# Patient Record
Sex: FEMALE | Race: WHITE | URBAN - METROPOLITAN AREA
[De-identification: names, ages, dates, MRNs, and addresses within clinical notes are randomized per-mention and may not be internally consistent; named-entity substitution may affect disease eponyms.]

---

## 2017-04-25 ENCOUNTER — MYC MEDICAL ADVICE (OUTPATIENT)
Dept: OBGYN | Facility: CLINIC | Age: 27
End: 2017-04-25

## 2017-04-25 DIAGNOSIS — Z30.41 ENCOUNTER FOR SURVEILLANCE OF CONTRACEPTIVE PILLS: ICD-10-CM

## 2017-04-26 RX ORDER — LEVONORGESTREL AND ETHINYL ESTRADIOL 0.15-0.03
1 KIT ORAL DAILY
Qty: 84 TABLET | Refills: 1 | Status: SHIPPED | OUTPATIENT
Start: 2017-04-26

## 2018-07-19 ENCOUNTER — APPOINTMENT (OUTPATIENT)
Dept: GENERAL RADIOLOGY | Facility: CLINIC | Age: 28
End: 2018-07-19
Attending: STUDENT IN AN ORGANIZED HEALTH CARE EDUCATION/TRAINING PROGRAM
Payer: OTHER GOVERNMENT

## 2018-07-19 ENCOUNTER — HOSPITAL ENCOUNTER (EMERGENCY)
Facility: CLINIC | Age: 28
Discharge: HOME | End: 2018-07-19
Attending: STUDENT IN AN ORGANIZED HEALTH CARE EDUCATION/TRAINING PROGRAM | Admitting: STUDENT IN AN ORGANIZED HEALTH CARE EDUCATION/TRAINING PROGRAM
Payer: OTHER GOVERNMENT

## 2018-07-19 VITALS
HEIGHT: 68 IN | SYSTOLIC BLOOD PRESSURE: 110 MMHG | BODY MASS INDEX: 22.73 KG/M2 | OXYGEN SATURATION: 98 % | TEMPERATURE: 99 F | DIASTOLIC BLOOD PRESSURE: 76 MMHG | HEART RATE: 98 BPM | RESPIRATION RATE: 16 BRPM | WEIGHT: 150 LBS

## 2018-07-19 DIAGNOSIS — S60.212A CONTUSION OF LEFT WRIST, INITIAL ENCOUNTER: ICD-10-CM

## 2018-07-19 LAB
ALBUMIN UR-MCNC: NEGATIVE MG/DL
APPEARANCE UR: CLEAR
BILIRUB UR QL STRIP: NEGATIVE
COLOR UR AUTO: ABNORMAL
GLUCOSE UR STRIP-MCNC: NEGATIVE MG/DL
HCG UR QL: NEGATIVE
HGB UR QL STRIP: NEGATIVE
KETONES UR STRIP-MCNC: 20 MG/DL
LEUKOCYTE ESTERASE UR QL STRIP: NEGATIVE
NITRATE UR QL: NEGATIVE
PH UR STRIP: 6 PH (ref 5–7)
SOURCE: ABNORMAL
SP GR UR STRIP: 1 (ref 1–1.03)
UROBILINOGEN UR STRIP-MCNC: 0 MG/DL (ref 0–2)

## 2018-07-19 PROCEDURE — 99284 EMERGENCY DEPT VISIT MOD MDM: CPT | Mod: Z6 | Performed by: STUDENT IN AN ORGANIZED HEALTH CARE EDUCATION/TRAINING PROGRAM

## 2018-07-19 PROCEDURE — 73110 X-RAY EXAM OF WRIST: CPT | Mod: LT

## 2018-07-19 PROCEDURE — 81025 URINE PREGNANCY TEST: CPT | Performed by: STUDENT IN AN ORGANIZED HEALTH CARE EDUCATION/TRAINING PROGRAM

## 2018-07-19 PROCEDURE — 81003 URINALYSIS AUTO W/O SCOPE: CPT | Performed by: STUDENT IN AN ORGANIZED HEALTH CARE EDUCATION/TRAINING PROGRAM

## 2018-07-19 PROCEDURE — 99284 EMERGENCY DEPT VISIT MOD MDM: CPT | Performed by: STUDENT IN AN ORGANIZED HEALTH CARE EDUCATION/TRAINING PROGRAM

## 2018-07-19 NOTE — ED AVS SNAPSHOT
[facility] Emergency Department    [address] [address]    Phone:  [phone]    Fax:  [phone]                                       Lainey Stinson   MRN: [id number]    Department:  [facility] Emergency Department   Date of Visit:  7/19/2018           After Visit Summary Signature Page     I have received my discharge instructions, and my questions have been answered. I have discussed any challenges I see with this plan with the nurse or doctor.    ..........................................................................................................................................  Patient/Patient Representative Signature      ..........................................................................................................................................  Patient Representative Print Name and Relationship to Patient    ..................................................               ................................................  Date                                            Time    ..........................................................................................................................................  Reviewed by Signature/Title    ...................................................              ..............................................  Date                                                            Time

## 2018-07-19 NOTE — ED AVS SNAPSHOT
[facility] Emergency Department    [address]    [facility] [address]    Phone:  [phone]    Fax:  [phone]                                       Lainey Stinson   MRN: [id number]    Department:  [facility] Emergency Department   Date of Visit:  7/19/2018           Patient Information     Date Of Birth          1990        Your diagnoses for this visit were:     Contusion of left wrist, initial encounter        You were seen by Kevin Caldwell DO.      Follow-up Information     Follow up with [facility]. Schedule an appointment as soon as possible for a visit in 5 days.    Why:  Followup for reevaluation if symptoms persist.    Contact information:    [address]  [address]  [facility] [address] [phone]      Discharge References/Attachments     BRUISES (CONTUSIONS) (ENGLISH)    WRIST SPRAIN (ENGLISH)      24 Hour Appointment Hotline       To make an appointment at any [facility] clinic, call [phone] ([phone]). If you don't have a family doctor or clinic, we will help you find one. [facility] clinics are conveniently located to serve the needs of you and your family.             Review of your medicines      Our records show that you are taking the medicines listed below. If these are incorrect, please call your family doctor or clinic.        Dose / Directions Last dose taken    etonogestrel-ethinyl estradiol 0.12-0.015 MG/24HR vaginal ring   Commonly known as:  NUVARING   Quantity:  3 each        Insert 1 ring vaginally every 21 days then remove for 1 week then repeat with new ring.   Refills:  3        levonorgestrel-ethinyl estradiol 0.15-30 MG-MCG per tablet   Commonly known as:  NORDETTE   Dose:  1 tablet   Quantity:  84 tablet        Take 1 tablet by mouth daily   Refills:  1                Procedures and tests performed during your visit     UA reflex to Microscopic    XR Wrist Left G/E 3 Views      Orders  Needing Specimen Collection     None      Pending Results     No orders found from 7/17/2018 to 7/20/2018.            Pending Culture Results     No orders found from 7/17/2018 to 7/20/2018.            Pending Results Instructions     If you had any lab results that were not finalized at the time of your Discharge, you can call the ED Lab Result RN at [phone]. You will be contacted by this team for any positive Lab results or changes in treatment. The nurses are available 7 days a week from 10A to 6:30P.  You can leave a message 24 hours per day and they will return your call.        Test Results From Your Hospital Stay        7/19/2018  2:41 PM      Narrative     XR WRIST LEFT G/E 3 VIEWS 7/19/2018 1:50 PM    HISTORY: Pain and contusion after horse related injury.    COMPARISON: None.        Impression     IMPRESSION: Three-view left wrist show no fracture or malalignment.     ABHAY LAKE MD         7/19/2018  2:57 PM      Component Results     Component Value Ref Range & Units Status    Color Urine Straw  Final    Appearance Urine Clear  Final    Glucose Urine Negative NEG^Negative mg/dL Final    Bilirubin Urine Negative NEG^Negative Final    Ketones Urine 20 (A) NEG^Negative mg/dL Final    Specific Gravity Urine 1.005 1.003 - 1.035 Final    Blood Urine Negative NEG^Negative Final    pH Urine 6.0 5.0 - 7.0 pH Final    Protein Albumin Urine Negative NEG^Negative mg/dL Final    Urobilinogen mg/dL 0.0 0.0 - 2.0 mg/dL Final    Nitrite Urine Negative NEG^Negative Final    Leukocyte Esterase Urine Negative NEG^Negative Final    Source Midstream Urine  Final                Thank you for choosing [facility]       Thank you for choosing [facility] for your care. Our goal is always to provide you with excellent care. Hearing back from our patients is one way we can continue to improve our services. Please take a few minutes to complete the written survey that you may receive in the mail after you visit with us. Thank  you!        [facility] Information     [employer] gives you secure access to your electronic health record. If you see a primary care provider, you can also send messages to your care team and make appointments. If you have questions, please call your primary care clinic.  If you do not have a primary care provider, please call [phone] and they will assist you.        Care EveryWhere ID     This is your Care EveryWhere ID. This could be used by other organizations to access your [facility] medical records  [id number]        Equal Access to Services     RENY HAWLEY : Yenni zavaleta Somilka, waaxda luqadaha, qaybta kaalmacesar hoffman, poonam henry. So [facility] [phone].    ATENCIÓN: Si habla español, tiene a begum disposición servicios gratuitos de asistencia lingüística. Llame al [phone].    We comply with applicable federal civil rights laws and [place] laws. We do not discriminate on the basis of race, color, national origin, age, disability, sex, sexual orientation, or gender identity.            After Visit Summary       This is your record. Keep this with you and show to your community pharmacist(s) and doctor(s) at your next visit.

## 2018-07-19 NOTE — ED NOTES
Was out spraying horses and one touched the elt gate and turned and Lainey fell to the ground. She has lt wrist pain and as the day has gone on she has come muscular upper lt side back/rib pain with jaw and rt side of face. Left wrist has bruising redness and swelling, which is her main concern

## 2018-07-19 NOTE — ED PROVIDER NOTES
History     Chief Complaint   Patient presents with     Trauma     trampled by horse 2 hr ago-left mid back,jaw pain-left wrist pain and bruising-no cervical pain and no loc     HPI  Lainey Stinson is a 28 year old female who presents for evaluation of left wrist injury which was sustained nearly 2 hours prior to arrival.  Patient explains that she was spreading down her horses when one of them got too close to the electrical fence and was shocked, the startled course apparently bumped the patient to the ground and may have stepped on her when running away.  The says that the incident happened very quickly and is not sure exactly how her left wrist was injured but notes contusion and swelling along the volar aspect of her left wrist.  She also believes that the left side of her torso may have been grazed by the horse but she denies other significant areas of pain.  She is without headache, facial trauma, neck pain, back pain, chest pain, shortness of breath, abdominal pain, nausea/vomiting, pelvic pain, or other extremity injuries.  No other complaints.    Problem List:    Patient Active Problem List    Diagnosis Date Noted     Dysplasia of cervix, low grade (VANGIE 1) 01/22/2013     Priority: Medium     1/17/13:Pap--LSIL.  1/25/13: Colpo: VANGIE 1, ECC negative.  Plan pap in 6 and 12 months with HPV testing at 12 months. Reminder in epic.   6/26/13:Pap--ASCUS. Repeat pap 6 months. Reminder in epic.   6/11/14:Pap--ASCUS, neg for HR HPV. Repeat pap and HPV in 1 year. In reminders  7/22/15:Pap--NIL, neg HPV. Pap in 1 year. Reminder placed in TRACKING (per office visit notes, planning to move within the next month..)  8/24/16:Pap--NIL. Routine screening.         Oligomenorrhea 01/31/2012     Priority: Medium     PCOS (polycystic ovarian syndrome) 01/27/2012     Priority: Medium     CARDIOVASCULAR SCREENING; LDL GOAL LESS THAN 160 10/31/2010     Priority: Medium     Acne 12/18/2008     Priority: Medium     Allergic  rhinitis due to other allergen 04/09/2007     Priority: Medium        Past Medical History:    Past Medical History:   Diagnosis Date     Allergic rhinitis, cause unspecified      Appendicitis 2008     ASCUS favor benign 6/2014     ASCUS on Pap smear 6/2013     LSIL (low grade squamous intraepithelial lesion) on Pap smear 1/2013       Past Surgical History:    Past Surgical History:   Procedure Laterality Date     APPENDECTOMY  2008       Family History:    Family History   Problem Relation Age of Onset     Arthritis Mother      RA     Psychotic Disorder Brother      autism     Cancer Other      bone     C.A.D. Maternal Grandmother      Eye Disorder Maternal Grandmother      Diabetes Maternal Grandfather      Hypertension Maternal Grandfather      Cerebrovascular Disease Maternal Grandfather      Thyroid Disease Maternal Grandfather      Thyroid Disease Brother        Social History:  Marital Status:  Single [1]  Social History   Substance Use Topics     Smoking status: Never Smoker     Smokeless tobacco: Never Used     Alcohol use No        Medications:      etonogestrel-ethinyl estradiol (NUVARING) 0.12-0.015 MG/24HR vaginal ring   levonorgestrel-ethinyl estradiol (NORDETTE) 0.15-30 MG-MCG per tablet         Review of Systems  Constitutional:  Negative for fever or recent illness.  HENT:  Negative oral pain or dental injury.  Eye:  Negative for visual changes from baseline.  Cardiovascular:  Negative for chest pain.  Respiratory:  Negative for shortness of breath.  Gastrointestinal:  Negative for abdominal pain, nausea or vomiting.  Musculoskeletal: As of her left wrist pain with contusion.  Negative for finger, hand, elbow, shoulder, or other extremity injuries.  Denies neck pain or back pain.  Neurological:  Negative for headache, sensory deficits or weakness.  Skin: Left wrist contusion.  No lacerations or bleeding skin tissue wounds.    All others reviewed and are negative.      Physical Exam   BP:  "118/80  Pulse: 98  Heart Rate: 100  Temp: 99  F (37.2  C)  Resp: 16  Height: 172.7 cm (5' 8\")  Weight: 68 kg (150 lb)  SpO2: 100 %      Physical Exam  Constitutional:  Well developed, well nourished.  Appears stable and in no acute distress.  Head:  Atraumatic appearance of face.  Negative for Raccoon eyes and Padilla sign.  No tenderness to palpation of facial bones or skull circumferentially.  Eye:  No obvious proptosis or subconjunctival hemorrhage.  Eyelids appear symmetrical.  PERRLA.    Oral:  Patient is without trismus or malocclusion.  Moist oral mucosa without oral laceration.  No acute dental fracture, subluxation, or avulsion.  Ears:  Typical appearance of the external auditory canal bilaterally, tympanic membranes visualized and without hemotympanum.  No mastoid region tenderness.  Neck:  No tenderness to palpation of midline cervical vertebra.  Full ROM without pain.    Cardiovascular:  No cyanosis.  RRR.  No audible murmurs noted.   Respiratory/Chest:  Effort normal, no respiratory distress.  CTAB without diminished regions.  Gastrointestinal:  Soft, nontender and nondistended.  No guarding, rigidity, or rebound tenderness.  No organomegaly.  Genitourinary/rectal:  Noncontributory.  Musculoskeletal:  No hip tenderness or pelvic instability.  No step-offs and no tenderness to palpation of midline thoracic or lumbosacral vertebra.  Left volar wrist contusion without obvious deformity or wound, moderate tenderness to palpation.  No left hand, digital, or snuffbox tenderness.  Patient is able to abduct fingers against resistance, oppose thumb to index finger, and extend as expected.  Sensation intact of all digits along median, radial, and ulnar nerve distributions.  FDP, FDS, and Extensor tendons intact.  No cyanosis and capillary refill less than 2 seconds in each digit.  2/4 palpable radial and ulnar pulses.  Neuro:  Patient is alert and oriented.   Skin:  Skin is warm and dry, not diaphoretic.  No " identifiable contusions or ecchymosis of torso.  Psych:  Normal mood and affect, not overly anxious or clinically intoxicated.    Sybil Coma Scale - GCS (calculator)    Motor 6=Obeys commands   Verbal 5=Oriented   Eye Opening 4=Spontaneous   Total: 15       ED Course     ED Course     Procedures              Critical Care time:  none               Results for orders placed or performed during the hospital encounter of 07/19/18 (from the past 24 hour(s))   XR Wrist Left G/E 3 Views    Narrative    XR WRIST LEFT G/E 3 VIEWS 7/19/2018 1:50 PM    HISTORY: Pain and contusion after horse related injury.    COMPARISON: None.      Impression    IMPRESSION: Three-view left wrist show no fracture or malalignment.     ABHAY LAKE MD   UA reflex to Microscopic   Result Value Ref Range    Color Urine Straw     Appearance Urine Clear     Glucose Urine Negative NEG^Negative mg/dL    Bilirubin Urine Negative NEG^Negative    Ketones Urine 20 (A) NEG^Negative mg/dL    Specific Gravity Urine 1.005 1.003 - 1.035    Blood Urine Negative NEG^Negative    pH Urine 6.0 5.0 - 7.0 pH    Protein Albumin Urine Negative NEG^Negative mg/dL    Urobilinogen mg/dL 0.0 0.0 - 2.0 mg/dL    Nitrite Urine Negative NEG^Negative    Leukocyte Esterase Urine Negative NEG^Negative    Source Midstream Urine    HCG qualitative urine (UPT)   Result Value Ref Range    HCG Qual Urine Negative NEG^Negative       Medications - No data to display    Assessments & Plan (with Medical Decision Making)   Lainey Stinson is a 28 year old female who presents to the emergency department for complaint of left volar wrist injury sustained 2 hours prior to arrival.  Based on her symptoms and the clinical examination, there is no evidence to suggest bony deformity, skin injury, tendon rupture, or neurovascular deficits.  Radiographic imaging is unable to identify a fracture or other acute bony abnormality.  Clinical impression is that she has likely suffered from contusion  and possible sprain.  Recommend weightbearing as tolerated with use of brace or Ace wrap.  During later discussions she noted that she had felt a little pain or discomfort along the left posterior torso, no sign of contusion or ecchymosis, no active pain, no tenderness, and no diminished lung sounds.  May have been related to impact with the ground.  She seems comfortable with discharge plan including close monitoring for any new or developing injuries or symptoms.    Disclaimer:  This note consists of symbols derived from keyboarding, dictation, and/or voice recognition software.  As a result, there may be errors in the script that have gone undetected.  Please consider this when interpreting information found in the chart.         I have reviewed the nursing notes.    I have reviewed the findings, diagnosis, plan and need for follow up with the patient.       Discharge Medication List as of 7/19/2018  3:03 PM          Final diagnoses:   Contusion of left wrist, initial encounter       7/19/2018   [facility] EMERGENCY DEPARTMENT     Kevin Caldwell DO  07/19/18 1547

## 2019-11-03 ENCOUNTER — HEALTH MAINTENANCE LETTER (OUTPATIENT)
Age: 29
End: 2019-11-03

## 2020-11-16 ENCOUNTER — HEALTH MAINTENANCE LETTER (OUTPATIENT)
Age: 30
End: 2020-11-16

## 2021-09-12 ENCOUNTER — HEALTH MAINTENANCE LETTER (OUTPATIENT)
Age: 31
End: 2021-09-12

## 2022-01-02 ENCOUNTER — HEALTH MAINTENANCE LETTER (OUTPATIENT)
Age: 32
End: 2022-01-02

## 2022-11-19 ENCOUNTER — HEALTH MAINTENANCE LETTER (OUTPATIENT)
Age: 32
End: 2022-11-19

## 2023-04-09 ENCOUNTER — HEALTH MAINTENANCE LETTER (OUTPATIENT)
Age: 33
End: 2023-04-09